# Patient Record
(demographics unavailable — no encounter records)

---

## 2024-12-26 NOTE — ASSESSMENT
[FreeTextEntry1] : 30 year old male presents with upper and lower back pain.  It has progressed over the last few months. He denies radicular pain, recent illness, fevers, numbness, weakness, balance problems, saddle anesthesia, urinary retention or fecal incontinence.  No injury.  He has been doing PT with [facility] for over 2 months without relief.  He takes Tramadol, diclofenac and cyclobenzaprine without much relief. He will be sent for thoracic and lumbar MRIs. Continue PT.  Given gabapentin. F/U after MRIs. We discussed red flag symptoms that would require emergent evaluation. He knows to call with any questions or concerns or if his symptoms acutely worsen.

## 2024-12-26 NOTE — PHYSICAL EXAM
[de-identified] : General: No acute distress, conversant, well-nourished. Head: Normocephalic, atraumatic Neck: trachea midline, FROM Heart: normotensive and normal rate and rhythm Lungs: No labored breathing Skin: No abrasions, no rashes, no edema Psych: Alert and oriented to person, place and time Extremities: no peripheral edema or digital cyanosis Gait: Normal gait. Can perform tandem gait.   Vascular: warm and well perfused distally, palpable distal pulses  MSK: Spine:  No tenderness to palpation.   NEURO: Sensation           Left            C5     2/2                C6     2/2                C7     2/2                C8     2/2               T1     2/2                        Right          C5     2/2                C6     2/2                C7     2/2                C8     2/2               T1     2/2        Motor:                                                 Left              C5 (deltoid abduction)             5/5                C6 (biceps flexion)                   5/5                 C7 (triceps extension)             5/5                C8 (finger flexion)                     5/5                T1 (interosseous)                     5/5                                                            Right            C5 (deltoid abduction)             5/5                C6 (biceps flexion)                   5/5                 C7 (triceps extension)             5/5                C8 (finger flexion)                     5/5                T1 (interosseous)                     5/5                       Sensation  Left L2  -  2/2             Left L3  -  2/2 Left L4  -  2/2 Left L5  -  2/2 Left S1  -  2/2  Right L2  -  2/2             Right L3  -  2/2 Right L4  -  2/2 Right L5  -  2/2 Right S1  -  2/2  Motor:  Left L2 (hip flexion)                            5/5                 Left L3 (knee extension)                   5/5                 Left L4 (ankle dorsiflexion)                 5/5                 Left L5 (long toe extensor)                5/5                 Left S1 (ankle plantar flexion)           5/5  Right L2 (hip flexion)                            5/5                 Right L3 (knee extension)                   5/5                 Right L4 (ankle dorsiflexion)                 5/5                 Right L5 (long toe extensor)                5/5                 Right S1 (ankle plantar flexion)           5/5  Reflexes: Normal and symmetric Negative Spurlings test.  Negative Hoffmans reflex.   Negative clonus.  Down-going Babinski. [de-identified] : I ordered radiographs to evaluate the patient's symptoms. Thoracic 2 view radiographs obtained in the office today shows no fracture or dislocation.   Thoracolumbar scoliosis.  Lumbar 4 view radiographs taken in the office today show no dislocation or fracture.  Thoracolumbar scoliosis.  No instability on dynamic series.

## 2024-12-26 NOTE — HISTORY OF PRESENT ILLNESS
[de-identified] : 30 year old male presents with upper and lower back pain.  It has progressed over the last few months. He denies radicular pain, recent illness, fevers, numbness, weakness, balance problems, saddle anesthesia, urinary retention or fecal incontinence.  No injury.  He has been doing PT with [facility] for over 2 months without relief.  He takes Tramadol, diclofenac and cyclobenzaprine without much relief.

## 2025-02-13 NOTE — PHYSICAL EXAM
[de-identified] : MRI of the lumbar spine 2/7/2025  L4-L5: Central disc protrusion superimposed on mild to moderate diffuse disc bulging and mild facet arthrosis contributing to mild spinal canal stenosis, impingement of the traversing L5 nerve roots in the lateral recesses, right worse than left, and mild bilateral foraminal encroachment.  Dextroscoliosis.   MRI of the thoracic spine 2/7/2025  Mild multilevel facet arthrosis.  Otherwise, unremarkable MRI of the thoracic spine without contrast.